# Patient Record
Sex: FEMALE | Race: WHITE
[De-identification: names, ages, dates, MRNs, and addresses within clinical notes are randomized per-mention and may not be internally consistent; named-entity substitution may affect disease eponyms.]

---

## 2018-11-26 ENCOUNTER — HOSPITAL ENCOUNTER (OUTPATIENT)
Dept: HOSPITAL 89 - MAMO | Age: 53
End: 2018-11-26
Attending: PHYSICIAN ASSISTANT
Payer: COMMERCIAL

## 2018-11-26 DIAGNOSIS — Z12.31: Primary | ICD-10-CM

## 2018-11-26 PROCEDURE — 77067 SCR MAMMO BI INCL CAD: CPT

## 2018-11-26 PROCEDURE — 77063 BREAST TOMOSYNTHESIS BI: CPT

## 2018-11-27 NOTE — RADIOLOGY IMAGING REPORT
FACILITY: [facility] 

 

PATIENT NAME: PRECIOUS WORRELL

: [id number]

MR: [id number]

V: [id number]

EXAM DATE: [id number]

ORDERING PHYSICIAN: PRECIOUS ROSEN

TECHNOLOGIST: Nevaeh Dimas

 

PROCEDURE:BILATERAL DIGITAL SCREENING MAMMOGRAM WITH CAD ASSISTED 

INTERPRETATION & 3D TOMOSYNTHESIS 

 

COMPARISON:Prior mammograms 17, 10/25/13, 13.

 

INDICATIONS:SCREENING

 

FINDINGS:  

Mildly heterogeneous fibroglandular tissue is seen throughout the 

breasts. The parenchymal pattern has remained stable allowing for 

difference in mammographic technique & patient positioning. There is 

no evidence of malignant appearing mass, malignant appearing 

calcifications or other secondary sign of malignancy in either breast.

 

DIAGNOSTIC CATEGORY 1--NEGATIVE.  

 

RECOMMENDATIONS:

ROUTINE MAMMOGRAM AND CLINICAL EVALUATION.   

 

IMPRESSION:

BIRADS 1: Negative. 

No significant abnormality is seen.

 

 

 

 

 

 

 

 

 

Dictated by:  Florence Alexandra M.D. on 2018 at 16:37   

Transcribed by: FIX on 2018 at 8:59    

Approved by:  Florence Alexandra M.D. on 2018 at 16:41   

[facility]